# Patient Record
(demographics unavailable — no encounter records)

---

## 2024-11-18 NOTE — CONSULT LETTER
[Dear  ___] : Dear  [unfilled], [Consult Letter:] : I had the pleasure of evaluating your patient, [unfilled]. [Please see my note below.] : Please see my note below. [Sincerely,] : Sincerely, [FreeTextEntry3] : Dr. Adonay Adamson

## 2024-11-18 NOTE — HISTORY OF PRESENT ILLNESS
[de-identified] : DAVID is a 17 year male here today SPO: Right hip arthroscopy with labrum repair, femoroplasty, acetabuloplasty, capsule repair; DOS: 2/14/2024. Of note, patient has been compliant with physical therapy. 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 Patient denies any recent fevers, chills or night sweats. Patient denies any radiating pain, numbness, tingling sensations, burning sensations, urinary or bowel incontinence.

## 2024-11-18 NOTE — DISCUSSION/SUMMARY
[de-identified] : Patient is doing well post-operatively. Patient is cleared to return to physical activity as tolerated. All questions were answered and the patient verbalized understanding. The patient is in agreement with this treatment plan. Patient will follow up as needed for repeat clinical assessment.

## 2024-11-18 NOTE — REASON FOR VISIT
[Follow-Up Visit] : a follow-up visit for [FreeTextEntry2] : SPO: Right hip arthroscopy with labrum repair, femoroplasty, acetabuloplasty, capsule repair; DOS: 2/14/2024.

## 2024-11-18 NOTE — HISTORY OF PRESENT ILLNESS
[de-identified] : DAVID is a 17 year male here today SPO: Right hip arthroscopy with labrum repair, femoroplasty, acetabuloplasty, capsule repair; DOS: 2/14/2024. Of note, patient has been compliant with physical therapy. 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 Patient denies any recent fevers, chills or night sweats. Patient denies any radiating pain, numbness, tingling sensations, burning sensations, urinary or bowel incontinence.

## 2024-11-18 NOTE — PHYSICAL EXAM
[de-identified] : General: Well appearing, no acute distress Neurologic: A&Ox3, No focal deficits Head: NCAT without abrasions, lacerations, or ecchymosis to head, face, or scalp Eyes: No scleral icterus, no gross abnormalities Respiratory: Equal chest wall expansion bilaterally, no accessory muscle use Lymphatic: No lymphadenopathy palpated Skin: Warm and dry Psychiatric: Normal mood and affect  Examination of the Right hip reveals no obvious deformity or leg length discrepancy. There is no swelling noted. The patient is nontender to palpation over the greater trochanter, groin, and IT band. The patients range of motion is to 110 degrees of hip flexion, 40 degrees of abduction, 20 degrees of adduction, 25 degrees of internal rotation with minimal pain and 45 degrees of external rotation. The patient has 5/5 strength to resisted hip flexion, abduction and adduction. The patient has a negative GERMAN and FADIR Test. Negative Gagnon Test. Negative resisted SLR test at 30 degrees of hip flexion ([facility]). Negative Piriformis Test. No SI joint instability. There is no pain with logrolling. The calf and thigh are soft and nontender bilaterally. The patient is grossly neurovascularly intact distally.  [de-identified] : No new imaging.

## 2024-11-18 NOTE — ADDENDUM
[FreeTextEntry1] : Documented by Ijeoma Rodriguez acting as a scribe for Dr. Adamson and Octavio Dean PA-C on 11/18/2024. All medical record entries made by the Scribe were at my, Dr. Adamson, and Octavio Dean's, direction and personally dictated by me on 11/18/2024. I have reviewed the chart and agree that the record accurately reflects my personal performance of the history, physical exam, procedure and imaging.

## 2024-11-18 NOTE — PHYSICAL EXAM
[de-identified] : General: Well appearing, no acute distress Neurologic: A&Ox3, No focal deficits Head: NCAT without abrasions, lacerations, or ecchymosis to head, face, or scalp Eyes: No scleral icterus, no gross abnormalities Respiratory: Equal chest wall expansion bilaterally, no accessory muscle use Lymphatic: No lymphadenopathy palpated Skin: Warm and dry Psychiatric: Normal mood and affect  Examination of the Right hip reveals no obvious deformity or leg length discrepancy. There is no swelling noted. The patient is nontender to palpation over the greater trochanter, groin, and IT band. The patients range of motion is to 110 degrees of hip flexion, 40 degrees of abduction, 20 degrees of adduction, 25 degrees of internal rotation with minimal pain and 45 degrees of external rotation. The patient has 5/5 strength to resisted hip flexion, abduction and adduction. The patient has a negative GERMAN and FADIR Test. Negative Gagnon Test. Negative resisted SLR test at 30 degrees of hip flexion ([facility]). Negative Piriformis Test. No SI joint instability. There is no pain with logrolling. The calf and thigh are soft and nontender bilaterally. The patient is grossly neurovascularly intact distally.  [de-identified] : No new imaging.

## 2024-11-18 NOTE — DISCUSSION/SUMMARY
[de-identified] : Patient is doing well post-operatively. Patient is cleared to return to physical activity as tolerated. All questions were answered and the patient verbalized understanding. The patient is in agreement with this treatment plan. Patient will follow up as needed for repeat clinical assessment.

## 2024-11-18 NOTE — REVIEW OF SYSTEMS
[Negative] : Heme/Lymph [FreeTextEntry9] : SPO: Right hip arthroscopy with labrum repair, femoroplasty, acetabuloplasty, capsule repair; DOS: 2/14/2024.